# Patient Record
Sex: FEMALE | Race: WHITE | Employment: FULL TIME | URBAN - METROPOLITAN AREA
[De-identification: names, ages, dates, MRNs, and addresses within clinical notes are randomized per-mention and may not be internally consistent; named-entity substitution may affect disease eponyms.]

---

## 2019-12-13 ENCOUNTER — OFFICE VISIT (OUTPATIENT)
Dept: NEUROLOGY | Facility: CLINIC | Age: 54
End: 2019-12-13
Payer: COMMERCIAL

## 2019-12-13 VITALS
BODY MASS INDEX: 27.09 KG/M2 | HEIGHT: 62 IN | WEIGHT: 147.2 LBS | HEART RATE: 83 BPM | SYSTOLIC BLOOD PRESSURE: 123 MMHG | DIASTOLIC BLOOD PRESSURE: 82 MMHG | OXYGEN SATURATION: 96 %

## 2019-12-13 DIAGNOSIS — M51.369 DEGENERATION OF LUMBAR INTERVERTEBRAL DISC: Primary | ICD-10-CM

## 2019-12-13 PROCEDURE — 99202 OFFICE O/P NEW SF 15 MIN: CPT | Performed by: NEUROLOGICAL SURGERY

## 2019-12-13 RX ORDER — CHLORAL HYDRATE 500 MG
1 CAPSULE ORAL 2 TIMES DAILY
COMMUNITY
Start: 2019-08-09

## 2019-12-13 RX ORDER — GABAPENTIN 300 MG/1
1 CAPSULE ORAL 2 TIMES DAILY
Refills: 2 | COMMUNITY
Start: 2019-10-04

## 2019-12-13 RX ORDER — VITAMIN E 268 MG
1000 CAPSULE ORAL DAILY
COMMUNITY
Start: 2019-08-09

## 2019-12-13 ASSESSMENT — PAIN SCALES - GENERAL: PAINLEVEL: MILD PAIN (2)

## 2019-12-13 ASSESSMENT — MIFFLIN-ST. JEOR: SCORE: 1210.94

## 2019-12-13 NOTE — PROGRESS NOTES
Ms. Shultz returns 4 months following an uncomplicated left L5-S1 hemilaminectomy and microdiscectomy for removal of recurrent disc herniation.  She reports initial resolution of her left buttock and leg symptoms.  She requested a program of physical therapy and noted that on her last physical therapy visit she experienced the return of pain into her left buttock and leg.  This occurred about 4 weeks ago.  She reports some interval improvement in the symptoms and states that this is not present to the degree which she experienced prior to surgery.  It does not appear to be lifestyle limiting at present but she is quite anxious regarding the symptoms and what they may portend.  She notes difficulty with range of motion because of persistent left buttock and proximal leg pain.  She has no symptoms consistent with focal neurologic deficit at present.    On examination gait is normal without antalgic component.  Motor examination of her bilateral lower extremity shows 5/5 strength throughout including plantar and dorsiflexion at the ankles while weightbearing.  Proximal bilateral lower extremity strength is intact.  Straight leg raising is negative bilaterally for radiculopathy.  Sensory examination is unremarkable.  Deep tendon reflexes are 1+ and equal at the knees and ankles bilaterally.  Lumbar range of motion is limited in flexion however because of increasing back pain and pain toward the left buttock.    There are no new imaging studies available for review.    Assessment: Left S1 radiculitis following successful left L5-S1 hemilaminectomy and microdiscectomy for a recurrent disc herniation in August 2019.  I reviewed the differential diagnosis for these recurrent and continuing symptoms.  Based upon the lack of objective neurologic deficit, relatively short duration of the symptoms and the patient's believe that they are improving slightly, I would not recommend interventional procedures or additional  diagnostic testing at present.  We discussed the potential issues which might arise if her symptoms increase in severity.  I told her that should this problem become lifestyle limiting then she should contact the office and we will order an interlaminar injection at left L5-S1.  If her anxiety regarding persistent symptoms is overwhelming, then a repeat lumbar MRI scan might be considered to further define the L5-S1 level on the left side.  I have asked her to contact us directly or to call Dr. Correa her primary care provider if her symptoms increase in severity or produce lifestyle limiting symptoms.    Approximately 20 minutes was spent in direct contact with the patient of which 15 minutes were spent reviewing clinical situation, differential diagnosis and management alternatives.

## 2019-12-13 NOTE — LETTER
12/13/2019         RE: Shira Shultz  [address] [facility] [id number]        Dear Colleague,    Thank you for referring your patient, Shira Shultz, to the [facility]. Please see a copy of my visit note below.    Ms. Shultz returns 4 months following an uncomplicated left L5-S1 hemilaminectomy and microdiscectomy for removal of recurrent disc herniation.  She reports initial resolution of her left buttock and leg symptoms.  She requested a program of physical therapy and noted that on her last physical therapy visit she experienced the return of pain into her left buttock and leg.  This occurred about 4 weeks ago.  She reports some interval improvement in the symptoms and states that this is not present to the degree which she experienced prior to surgery.  It does not appear to be lifestyle limiting at present but she is quite anxious regarding the symptoms and what they may portend.  She notes difficulty with range of motion because of persistent left buttock and proximal leg pain.  She has no symptoms consistent with focal neurologic deficit at present.    On examination gait is normal without antalgic component.  Motor examination of her bilateral lower extremity shows 5/5 strength throughout including plantar and dorsiflexion at the ankles while weightbearing.  Proximal bilateral lower extremity strength is intact.  Straight leg raising is negative bilaterally for radiculopathy.  Sensory examination is unremarkable.  Deep tendon reflexes are 1+ and equal at the knees and ankles bilaterally.  Lumbar range of motion is limited in flexion however because of increasing back pain and pain toward the left buttock.    There are no new imaging studies available for review.    Assessment: Left S1 radiculitis following successful left L5-S1 hemilaminectomy and microdiscectomy for a recurrent disc herniation in August 2019.  I reviewed the differential diagnosis for these recurrent  and continuing symptoms.  Based upon the lack of objective neurologic deficit, relatively short duration of the symptoms and the patient's believe that they are improving slightly, I would not recommend interventional procedures or additional diagnostic testing at present.  We discussed the potential issues which might arise if her symptoms increase in severity.  I told her that should this problem become lifestyle limiting then she should contact the office and we will order an interlaminar injection at left L5-S1.  If her anxiety regarding persistent symptoms is overwhelming, then a repeat lumbar MRI scan might be considered to further define the L5-S1 level on the left side.  I have asked her to contact us directly or to call Dr. Correa her primary care provider if her symptoms increase in severity or produce lifestyle limiting symptoms.    Approximately 20 minutes was spent in direct contact with the patient of which 15 minutes were spent reviewing clinical situation, differential diagnosis and management alternatives.    Again, thank you for allowing me to participate in the care of your patient.        Sincerely,        Sukhjinder Orantes MD

## 2019-12-13 NOTE — NURSING NOTE
"Shira Shultz's goals for this visit include: return  She requests these members of her care team be copied on today's visit information:     PCP: Waldo Correa    Referring Provider:  [facility]  [address]   [address] [place] [id number]    /82   Pulse 83   Ht 1.575 m (5' 2\")   Wt 66.8 kg (147 lb 3.2 oz)   SpO2 96%   BMI 26.92 kg/m      Do you need any medication refills at today's visit? n  "